# Patient Record
(demographics unavailable — no encounter records)

---

## 2025-07-07 NOTE — DISCUSSION/SUMMARY
[FreeTextEntry1] : Impression: 1) Seizure - first event in her life on 6/18/25 associated with right upper extremity numbness. Found to have left sided brain lesion on MRI. 48h EEG unremarkable. LP with positive EBV IgG, otherwise unremarkable. No encephalitis panel sent  2) Weight loss - ~20lb unintentional weight loss over the past 6 months. Coincided with lower extremity swelling and difficulty ambulating, which has since improved since she is no longer working on her feet  3) Incidental splenic infarct and hepatomegaly - due to follow up with GI   Plan: 1) Review imaging with neurosurgery team  2) Continue Keppra. Will send Nayzilam prn

## 2025-07-07 NOTE — HISTORY OF PRESENT ILLNESS
[FreeTextEntry1] : Uyen is a 26 year old female presenting with seizures. Accompanied by her mother.  First seizure of her life on 6/18/25. Got a tattoo on her right upper extremity the night prior. That morning, remembers waking up telling her Mom that her R upper extremity was numb - not just the area around the tattoo, but down her entire arm into her fingertips. Denies weakness. Mom left for work around 7am. Uyen later remembers telling her sister and girlfriend of similar numbness just before losing consciousness and having a GTC. Sister called Mom around 10am that patient was unresponsive and convulsing for 5 minutes. Called EMS. Patient then had a second seizure when EMS arrived and was taken to [facility]. Transferred to [facility] for left sided brain mass.  Records reviewed from [facility].  -CT head with 2cm hypodensity within the superior posterior left frontal lobe without associated enhancement -MRI w/wo showed same CT findings with differentials including "acute hemorrhagic infarct vs acute infectious/inflammatory cerebritis/encephalitis vs ictal/postictal phenomenon vs malignancy - ex. glio-astrocytoma" -CT chest/abdomen/pelvis showed splenic infarction with hepatomegaly  -TTE showed mitral valve thickening  -LP pending IgG, CSF IgG index, fungitell B-D-Glucan CSF; EBV IgG positive; no encephalitis panel sent  -48h EEG unremarkable   Uncle with pituitary tumor, grandmother with a brain mass.  Father had history of seizures due to alcohol.   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 Progressed with crutches, and now able to ambulate today. Given a compound cream prn. Coincided with weight loss. Previously 120-125lbs, now 103lbs.   Played basketball in high school and college. Several concussions over the years but no LOC. Had an EEG in 2019 for a bad concussion which was unremarkable per patient.   Discharged on Keppra 750mg BID and Gabapentin 100mg TID. Recommended outpatient neurosurgery follow up for repeat MRI and likely surgery.  Since discharge, taking meds and denies further seizures or right upper extremity numbness. More tired on Keppra.   Patient presents today for additional opinion regarding surgery, would like to establish care with [facility] team.

## 2025-07-07 NOTE — HISTORY OF PRESENT ILLNESS
[FreeTextEntry1] : Uyen is a 26 year old female presenting with seizures. Accompanied by her mother.  First seizure of her life on 6/18/25. Got a tattoo on her right upper extremity the night prior. That morning, remembers waking up telling her Mom that her R upper extremity was numb - not just the area around the tattoo, but down her entire arm into her fingertips. Denies weakness. Mom left for work around 7am. Uyen later remembers telling her sister and girlfriend of similar numbness just before losing consciousness and having a GTC. Sister called Mom around 10am that patient was unresponsive and convulsing for 5 minutes. Called EMS. Patient then had a second seizure when EMS arrived and was taken to [facility]. Transferred to [facility] for left sided brain mass.  Records reviewed from [facility].  -CT head with 2cm hypodensity within the superior posterior left frontal lobe without associated enhancement -MRI w/wo showed same CT findings with differentials including "acute hemorrhagic infarct vs acute infectious/inflammatory cerebritis/encephalitis vs ictal/postictal phenomenon vs malignancy - ex. glio-astrocytoma" -CT chest/abdomen/pelvis showed splenic infarction with hepatomegaly  -TTE showed mitral valve thickening  -LP pending IgG, CSF IgG index, fungitell B-D-Glucan CSF; EBV IgG positive; no encephalitis panel sent  -48h EEG unremarkable   Uncle with pituitary tumor, grandmother with a brain mass.  Father had history of seizures due to alcohol.   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 Progressed with crutches, and now able to ambulate today. Given a compound cream prn. Coincided with weight loss. Previously 120-125lbs, now 103lbs.   Played basketball in high school and college. Several concussions over the years but no LOC. Had an EEG in 2019 for a bad concussion which was unremarkable per patient.   Discharged on Keppra 750mg BID and Gabapentin 100mg TID. Recommended outpatient neurosurgery follow up for repeat MRI and likely surgery.  Since discharge, taking meds and denies further seizures or right upper extremity numbness. More tired on Keppra.   Patient presents today for additional opinion regarding surgery, would like to establish care with [facility] team.

## 2025-07-14 NOTE — PHYSICAL EXAM
[General Appearance - Alert] : alert [General Appearance - In No Acute Distress] : in no acute distress [Oriented To Time, Place, And Person] : oriented to person, place, and time [Person] : oriented to person [Place] : oriented to place [Time] : oriented to time [Motor Tone] : muscle tone was normal in all four extremities [Motor Strength] : muscle strength was normal in all four extremities [Neck Appearance] : the appearance of the neck was normal [] : no respiratory distress [Abnormal Walk] : normal gait

## 2025-07-14 NOTE — REVIEW OF SYSTEMS
[Seizures] : convulsions [Feeling Poorly] : not feeling poorly [Confused or Disoriented] : no confusion [Arm Weakness] : no arm weakness [Leg Weakness] : no leg weakness [Numbness] : no numbness [Cluster Headache] : no cluster headache [Anxiety] : no anxiety [Eyesight Problems] : no eyesight problems [Chest Pain] : no chest pain [Shortness Of Breath] : no shortness of breath [Incontinence] : no incontinence [Muscle Weakness] : no muscle weakness

## 2025-07-14 NOTE — HISTORY OF PRESENT ILLNESS
[FreeTextEntry1] : Uyen is a 26 year old female presenting with seizures. Accompanied by her mother. Patient has no PMHx.   First seizure of her life on 6/18/25. Got a tattoo on her right upper extremity the night prior. That morning, remembers waking up telling her Mom that her R upper extremity was numb - not just the area around the tattoo, but down her entire arm into her fingertips. Denies weakness. Mom left for work around 7am. Uyen later remembers telling her sister and girlfriend of similar numbness just before losing consciousness and having a GTC. Sister called Mom around 10am that patient was unresponsive and convulsing for 5 minutes. Called EMS. Patient then had a second seizure when EMS arrived and was taken to [facility]. Transferred to [facility] for left sided brain mass.  Records reviewed from [facility].  -CT head with 2cm hypodensity within the superior posterior left frontal lobe without associated enhancement -MRI w/wo showed same CT findings with differentials including "acute hemorrhagic infarct vs acute infectious/inflammatory cerebritis/encephalitis vs ictal/postictal phenomenon vs malignancy - ex. glio-astrocytoma" -CT chest/abdomen/pelvis showed splenic infarction with hepatomegaly  -TTE showed mitral valve thickening  -LP pending IgG, CSF IgG index, fungitell B-D-Glucan CSF; EBV IgG positive; no encephalitis panel sent  -48h EEG unremarkable   Uncle with pituitary tumor, grandmother with a brain mass.  Father had history of seizures due to alcohol.   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 Progressed with crutches, and now able to ambulate today. Given a compound cream prn. Coincided with weight loss. Previously 120-125lbs, now 103lbs.   Played basketball in high school and college. Several concussions over the years but no LOC. Had an EEG in 2019 for a bad concussion which was unremarkable per patient.   Discharged on Keppra 750mg BID and Gabapentin 100mg TID. Recommended outpatient neurosurgery follow up for repeat MRI and likely surgery.  Since discharge, taking meds and denies further seizures or right upper extremity numbness. More tired on Keppra.   Patient presents today for additional opinion regarding surgery, would like to establish care with [facility].   TODAY 7/14/25: Patient referred by Moon Koehler for newly diagnosed seizures and new glioma found on MRI.

## 2025-07-14 NOTE — REASON FOR VISIT
[New Patient Visit] : a new patient visit [Referred By: _________] : Patient was referred by MARIAM [FreeTextEntry1] : brain mass

## 2025-07-14 NOTE — ASSESSMENT
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 The patient's established problem of seizures is stable on Keppra 750 mg BID. The differential diagnosis is low grade glioma.  I had a long discussion with the patient regarding the role of the need for craniotomy for resection and biopsy the mass. The patient was extensively educated about the nature of her disease process. Therapeutic and diagnostic tests include MRI brain w/wo in/ blood work which will be completed . The patient should continue to see Moon Koehler. She will be direct admit day prior.  I have explained the alternatives, risks and benefits to the patient and she understands and agrees to proceed. This risk of the procedure including but not limited to pain, infection, seizure, stroke, recurrence, residual disease, neurovascular injury, heart attack, pulmonary embolism, blindness, weakness, paralysis, and death have been carefully explained to the patient who clearly understands and agrees to proceed.

## 2025-07-14 NOTE — HISTORY OF PRESENT ILLNESS
[FreeTextEntry1] : Uyen is a 26 year old female presenting with seizures. Accompanied by her mother. Patient has no PMHx.   First seizure of her life on 6/18/25. Got a tattoo on her right upper extremity the night prior. That morning, remembers waking up telling her Mom that her R upper extremity was numb - not just the area around the tattoo, but down her entire arm into her fingertips. Denies weakness. Mom left for work around 7am. Uyen later remembers telling her sister and girlfriend of similar numbness just before losing consciousness and having a GTC. Sister called Mom around 10am that patient was unresponsive and convulsing for 5 minutes. Called EMS. Patient then had a second seizure when EMS arrived and was taken to [facility]. Transferred to [facility] for left sided brain mass.  Records reviewed from [facility].  -CT head with 2cm hypodensity within the superior posterior left frontal lobe without associated enhancement -MRI w/wo showed same CT findings with differentials including "acute hemorrhagic infarct vs acute infectious/inflammatory cerebritis/encephalitis vs ictal/postictal phenomenon vs malignancy - ex. glio-astrocytoma" -CT chest/abdomen/pelvis showed splenic infarction with hepatomegaly  -TTE showed mitral valve thickening  -LP pending IgG, CSF IgG index, fungitell B-D-Glucan CSF; EBV IgG positive; no encephalitis panel sent  -48h EEG unremarkable   Uncle with pituitary tumor, grandmother with a brain mass.  Father had history of seizures due to alcohol.   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 Progressed with crutches, and now able to ambulate today. Given a compound cream prn. Coincided with weight loss. Previously 120-125lbs, now 103lbs.   Played basketball in high school and college. Several concussions over the years but no LOC. Had an EEG in 2019 for a bad concussion which was unremarkable per patient.   Discharged on Keppra 750mg BID and Gabapentin 100mg TID. Recommended outpatient neurosurgery follow up for repeat MRI and likely surgery.  Since discharge, taking meds and denies further seizures or right upper extremity numbness. More tired on Keppra.   Patient presents today for additional opinion regarding surgery, would like to establish care with [facility].   TODAY 7/14/25: Patient referred by [facility] Koehler for newly diagnosed seizures and new glioma found on MRI.